# Patient Record
Sex: FEMALE | Race: WHITE | HISPANIC OR LATINO | URBAN - METROPOLITAN AREA
[De-identification: names, ages, dates, MRNs, and addresses within clinical notes are randomized per-mention and may not be internally consistent; named-entity substitution may affect disease eponyms.]

---

## 2023-06-23 ENCOUNTER — APPOINTMENT (RX ONLY)
Dept: URBAN - METROPOLITAN AREA CLINIC 54 | Facility: CLINIC | Age: 73
Setting detail: DERMATOLOGY
End: 2023-06-23

## 2023-06-23 DIAGNOSIS — L85.3 XEROSIS CUTIS: ICD-10-CM | Status: STABLE

## 2023-06-23 DIAGNOSIS — D22 MELANOCYTIC NEVI: ICD-10-CM | Status: STABLE

## 2023-06-23 DIAGNOSIS — L81.4 OTHER MELANIN HYPERPIGMENTATION: ICD-10-CM | Status: STABLE

## 2023-06-23 DIAGNOSIS — L65.9 NONSCARRING HAIR LOSS, UNSPECIFIED: ICD-10-CM

## 2023-06-23 DIAGNOSIS — L82.1 OTHER SEBORRHEIC KERATOSIS: ICD-10-CM | Status: STABLE

## 2023-06-23 PROBLEM — D22.5 MELANOCYTIC NEVI OF TRUNK: Status: ACTIVE | Noted: 2023-06-23

## 2023-06-23 PROCEDURE — ? SUNSCREEN RECOMMENDATIONS

## 2023-06-23 PROCEDURE — 11105 PUNCH BX SKIN EA SEP/ADDL: CPT

## 2023-06-23 PROCEDURE — 99203 OFFICE O/P NEW LOW 30 MIN: CPT | Mod: 25

## 2023-06-23 PROCEDURE — ? PRESCRIPTION

## 2023-06-23 PROCEDURE — 11104 PUNCH BX SKIN SINGLE LESION: CPT

## 2023-06-23 PROCEDURE — ? FULL BODY SKIN EXAM

## 2023-06-23 PROCEDURE — ? BIOPSY BY PUNCH METHOD

## 2023-06-23 PROCEDURE — ? COUNSELING

## 2023-06-23 PROCEDURE — ? SCREENING FOR COVID-19

## 2023-06-23 ASSESSMENT — LOCATION DETAILED DESCRIPTION DERM
LOCATION DETAILED: RIGHT MID-UPPER BACK
LOCATION DETAILED: SUPERIOR MID FOREHEAD
LOCATION DETAILED: POSTERIOR MID-PARIETAL SCALP
LOCATION DETAILED: RIGHT SUPERIOR PARIETAL SCALP
LOCATION DETAILED: PERIUMBILICAL SKIN
LOCATION DETAILED: LEFT DISTAL CALF
LOCATION DETAILED: LEFT MID-UPPER BACK

## 2023-06-23 ASSESSMENT — LOCATION SIMPLE DESCRIPTION DERM
LOCATION SIMPLE: ABDOMEN
LOCATION SIMPLE: LEFT UPPER BACK
LOCATION SIMPLE: SCALP
LOCATION SIMPLE: LEFT CALF
LOCATION SIMPLE: POSTERIOR SCALP
LOCATION SIMPLE: SUPERIOR FOREHEAD
LOCATION SIMPLE: RIGHT UPPER BACK

## 2023-06-23 ASSESSMENT — LOCATION ZONE DERM
LOCATION ZONE: FACE
LOCATION ZONE: TRUNK
LOCATION ZONE: LEG
LOCATION ZONE: SCALP

## 2023-06-23 NOTE — PROCEDURE: SCREENING FOR COVID-19
Has The Patient Been Infected With Covid-19 In The Past?: No
Previous Infection Text: The patient has recovered from a previous COVID-19 infection.
Detail Level: Simple
Impaired

## 2023-06-23 NOTE — PROCEDURE: BIOPSY BY PUNCH METHOD
Path Notes (To The Dermatopathologist): Target lesions noted. R/O GA, LA, Lymes and EM Path Notes (To The Dermatopathologist): Target lesions noted. R/O GA, ID, Lymes and EM

## 2023-06-30 ENCOUNTER — APPOINTMENT (RX ONLY)
Dept: URBAN - METROPOLITAN AREA CLINIC 54 | Facility: CLINIC | Age: 73
Setting detail: DERMATOLOGY
End: 2023-06-30

## 2023-06-30 DIAGNOSIS — Z48.02 ENCOUNTER FOR REMOVAL OF SUTURES: ICD-10-CM

## 2023-06-30 PROCEDURE — 99024 POSTOP FOLLOW-UP VISIT: CPT

## 2023-06-30 PROCEDURE — ? SUTURE REMOVAL (GLOBAL PERIOD)

## 2023-06-30 ASSESSMENT — LOCATION SIMPLE DESCRIPTION DERM
LOCATION SIMPLE: SCALP
LOCATION SIMPLE: RIGHT FOREHEAD

## 2023-06-30 ASSESSMENT — LOCATION DETAILED DESCRIPTION DERM
LOCATION DETAILED: RIGHT SUPERIOR FOREHEAD
LOCATION DETAILED: RIGHT SUPERIOR PARIETAL SCALP

## 2023-06-30 ASSESSMENT — LOCATION ZONE DERM
LOCATION ZONE: FACE
LOCATION ZONE: SCALP

## 2023-06-30 NOTE — PROCEDURE: SUTURE REMOVAL (GLOBAL PERIOD)
Detail Level: Detailed
Add 72423 Cpt? (Important Note: In 2017 The Use Of 58907 Is Being Tracked By Cms To Determine Future Global Period Reimbursement For Global Periods): yes

## 2023-07-27 ENCOUNTER — APPOINTMENT (RX ONLY)
Dept: URBAN - METROPOLITAN AREA CLINIC 54 | Facility: CLINIC | Age: 73
Setting detail: DERMATOLOGY
End: 2023-07-27

## 2023-07-27 DIAGNOSIS — L65.9 NONSCARRING HAIR LOSS, UNSPECIFIED: ICD-10-CM

## 2023-07-27 PROCEDURE — 11104 PUNCH BX SKIN SINGLE LESION: CPT

## 2023-07-27 PROCEDURE — ? SCREENING FOR COVID-19

## 2023-07-27 PROCEDURE — ? BIOPSY BY PUNCH METHOD

## 2023-07-27 ASSESSMENT — LOCATION SIMPLE DESCRIPTION DERM: LOCATION SIMPLE: POSTERIOR SCALP

## 2023-07-27 ASSESSMENT — LOCATION ZONE DERM: LOCATION ZONE: SCALP

## 2023-07-27 ASSESSMENT — LOCATION DETAILED DESCRIPTION DERM: LOCATION DETAILED: POSTERIOR MID-PARIETAL SCALP

## 2023-07-27 NOTE — PROCEDURE: BIOPSY BY PUNCH METHOD
Path Notes (To The Dermatopathologist): Target lesions noted. R/O GA, ND, Lymes and EM Path Notes (To The Dermatopathologist): Target lesions noted. R/O GA, [place], Lymes and EM

## 2023-08-04 ENCOUNTER — APPOINTMENT (RX ONLY)
Dept: URBAN - METROPOLITAN AREA CLINIC 54 | Facility: CLINIC | Age: 73
Setting detail: DERMATOLOGY
End: 2023-08-04

## 2023-08-04 DIAGNOSIS — Z48.02 ENCOUNTER FOR REMOVAL OF SUTURES: ICD-10-CM

## 2023-08-04 PROCEDURE — ? SUTURE REMOVAL (GLOBAL PERIOD)

## 2023-08-04 PROCEDURE — 99024 POSTOP FOLLOW-UP VISIT: CPT

## 2023-08-04 ASSESSMENT — LOCATION ZONE DERM: LOCATION ZONE: SCALP

## 2023-08-04 ASSESSMENT — LOCATION SIMPLE DESCRIPTION DERM: LOCATION SIMPLE: SCALP

## 2023-08-04 ASSESSMENT — LOCATION DETAILED DESCRIPTION DERM: LOCATION DETAILED: RIGHT CENTRAL PARIETAL SCALP

## 2023-08-04 NOTE — PROCEDURE: SUTURE REMOVAL (GLOBAL PERIOD)
Detail Level: Detailed
Add 86152 Cpt? (Important Note: In 2017 The Use Of 02875 Is Being Tracked By Cms To Determine Future Global Period Reimbursement For Global Periods): yes

## 2023-08-24 ENCOUNTER — APPOINTMENT (RX ONLY)
Dept: URBAN - METROPOLITAN AREA CLINIC 54 | Facility: CLINIC | Age: 73
Setting detail: DERMATOLOGY
End: 2023-08-24

## 2023-08-24 DIAGNOSIS — L63.8 OTHER ALOPECIA AREATA: ICD-10-CM

## 2023-08-24 PROCEDURE — ? SCREENING FOR COVID-19

## 2023-08-24 PROCEDURE — 99213 OFFICE O/P EST LOW 20 MIN: CPT

## 2023-08-24 PROCEDURE — ? PATHOLOGY DISCUSSION

## 2023-08-24 PROCEDURE — ? ADDITIONAL NOTES

## 2023-08-24 PROCEDURE — ? ORDER TESTS

## 2023-08-24 NOTE — PROCEDURE: ORDER TESTS
Tdap NV  glucola
Performing Laboratory: 0
Billing Type: Third-Party Bill
Expected Date Of Service: 08/24/2023
Bill For Surgical Tray: no

## 2023-08-24 NOTE — PROCEDURE: ADDITIONAL NOTES
Detail Level: Simple
Render Risk Assessment In Note?: no
Additional Notes: Discussed medication litfulo. Patient started the enrollment program and PA

## 2023-10-02 ENCOUNTER — APPOINTMENT (RX ONLY)
Dept: URBAN - METROPOLITAN AREA CLINIC 54 | Facility: CLINIC | Age: 73
Setting detail: DERMATOLOGY
End: 2023-10-02

## 2023-10-02 DIAGNOSIS — Z48.817 ENCOUNTER FOR SURGICAL AFTERCARE FOLLOWING SURGERY ON THE SKIN AND SUBCUTANEOUS TISSUE: ICD-10-CM

## 2023-10-02 PROCEDURE — ? POST-OP WOUND CHECK

## 2023-10-02 PROCEDURE — 99024 POSTOP FOLLOW-UP VISIT: CPT

## 2023-10-02 ASSESSMENT — LOCATION SIMPLE DESCRIPTION DERM: LOCATION SIMPLE: RIGHT SCALP

## 2023-10-02 ASSESSMENT — LOCATION ZONE DERM: LOCATION ZONE: SCALP

## 2023-10-02 ASSESSMENT — LOCATION DETAILED DESCRIPTION DERM: LOCATION DETAILED: RIGHT MEDIAL FRONTAL SCALP

## 2023-10-02 NOTE — PROCEDURE: POST-OP WOUND CHECK
Detail Level: Detailed
Add 94658 Cpt? (Important Note: In 2017 The Use Of 80613 Is Being Tracked By Cms To Determine Future Global Period Reimbursement For Global Periods): yes
Wound Evaluated By: ARMOND Fraga

## 2023-11-13 ENCOUNTER — RX ONLY (OUTPATIENT)
Age: 73
Setting detail: RX ONLY
End: 2023-11-13

## 2023-11-13 RX ORDER — RITLECITINIB 50 MG/1
1 CAPSULE ORAL QD
Qty: 30 | Refills: 6 | Status: ERX | COMMUNITY
Start: 2023-11-13

## 2023-11-21 ENCOUNTER — RX ONLY (OUTPATIENT)
Age: 73
Setting detail: RX ONLY
End: 2023-11-21

## 2023-11-21 RX ORDER — RITLECITINIB 50 MG/1
1 CAPSULE ORAL QD
Qty: 30 | Refills: 6 | Status: ERX

## 2023-12-13 ENCOUNTER — RX ONLY (OUTPATIENT)
Age: 73
Setting detail: RX ONLY
End: 2023-12-13

## 2023-12-13 RX ORDER — RITLECITINIB 50 MG/1
1 CAPSULE ORAL QD
Qty: 30 | Refills: 6 | Status: ERX

## 2024-08-29 ENCOUNTER — APPOINTMENT (RX ONLY)
Dept: URBAN - METROPOLITAN AREA CLINIC 54 | Facility: CLINIC | Age: 74
Setting detail: DERMATOLOGY
End: 2024-08-29

## 2024-08-29 DIAGNOSIS — L63.8 OTHER ALOPECIA AREATA: ICD-10-CM

## 2024-08-29 PROCEDURE — ? ADDITIONAL NOTES

## 2024-08-29 PROCEDURE — ? PATHOLOGY DISCUSSION

## 2024-08-29 PROCEDURE — ? COUNSELING

## 2024-08-29 PROCEDURE — 99213 OFFICE O/P EST LOW 20 MIN: CPT

## 2024-08-29 PROCEDURE — ? PRESCRIPTION SAMPLES PROVIDED

## 2024-08-29 PROCEDURE — ? SCREENING FOR COVID-19

## 2024-08-29 ASSESSMENT — LOCATION SIMPLE DESCRIPTION DERM: LOCATION SIMPLE: ANTERIOR SCALP

## 2024-08-29 ASSESSMENT — LOCATION ZONE DERM: LOCATION ZONE: SCALP

## 2024-08-29 ASSESSMENT — LOCATION DETAILED DESCRIPTION DERM: LOCATION DETAILED: MID-FRONTAL SCALP

## 2024-08-29 NOTE — PROCEDURE: PRESCRIPTION SAMPLES PROVIDED
Lot/Batch Number (Optional): [id number]
Samples Given: Litfulo
Detail Level: Zone
Expiration Date (Optional): 09/2025

## 2024-09-16 ENCOUNTER — RX ONLY (OUTPATIENT)
Age: 74
Setting detail: RX ONLY
End: 2024-09-16

## 2024-09-16 RX ORDER — RITLECITINIB 50 MG/1
1 CAPSULE ORAL QD
Qty: 30 | Refills: 11 | Status: ERX

## 2025-03-13 ENCOUNTER — APPOINTMENT (OUTPATIENT)
Dept: URBAN - METROPOLITAN AREA CLINIC 54 | Facility: CLINIC | Age: 75
Setting detail: DERMATOLOGY
End: 2025-03-13

## 2025-03-13 ENCOUNTER — RX ONLY (RX ONLY)
Age: 75
End: 2025-03-13

## 2025-03-13 DIAGNOSIS — L63.8 OTHER ALOPECIA AREATA: ICD-10-CM

## 2025-03-13 PROCEDURE — ? PHOTO-DOCUMENTATION

## 2025-03-13 PROCEDURE — ? COUNSELING

## 2025-03-13 PROCEDURE — ? ORDER TESTS

## 2025-03-13 PROCEDURE — ? FULL BODY SKIN EXAM - DECLINED

## 2025-03-13 PROCEDURE — 99214 OFFICE O/P EST MOD 30 MIN: CPT

## 2025-03-13 RX ORDER — RITLECITINIB 50 MG/1
1 CAPSULE ORAL QD
Qty: 30 | Refills: 11 | Status: ERX | COMMUNITY
Start: 2025-03-13

## 2025-03-13 ASSESSMENT — LOCATION SIMPLE DESCRIPTION DERM: LOCATION SIMPLE: LEFT SCALP

## 2025-03-13 ASSESSMENT — LOCATION DETAILED DESCRIPTION DERM: LOCATION DETAILED: LEFT MEDIAL FRONTAL SCALP

## 2025-03-13 ASSESSMENT — LOCATION ZONE DERM: LOCATION ZONE: SCALP

## 2025-04-04 ENCOUNTER — RX ONLY (RX ONLY)
Age: 75
End: 2025-04-04

## 2025-04-04 RX ORDER — RITLECITINIB 50 MG/1
1 CAPSULE ORAL QD
Qty: 30 | Refills: 11 | Status: ERX

## 2025-04-11 ENCOUNTER — RX ONLY (RX ONLY)
Age: 75
End: 2025-04-11

## 2025-04-11 RX ORDER — RITLECITINIB 50 MG/1
1 CAPSULE ORAL QD
Qty: 30 | Refills: 11 | Status: ERX